# Patient Record
Sex: FEMALE | Race: OTHER | NOT HISPANIC OR LATINO
[De-identification: names, ages, dates, MRNs, and addresses within clinical notes are randomized per-mention and may not be internally consistent; named-entity substitution may affect disease eponyms.]

---

## 2023-06-28 PROBLEM — Z00.00 ENCOUNTER FOR PREVENTIVE HEALTH EXAMINATION: Status: ACTIVE | Noted: 2023-06-28

## 2023-07-05 ENCOUNTER — NON-APPOINTMENT (OUTPATIENT)
Age: 53
End: 2023-07-05

## 2023-07-10 ENCOUNTER — NON-APPOINTMENT (OUTPATIENT)
Age: 53
End: 2023-07-10

## 2023-07-10 ENCOUNTER — APPOINTMENT (OUTPATIENT)
Dept: BREAST CENTER | Facility: CLINIC | Age: 53
End: 2023-07-10

## 2023-07-31 ENCOUNTER — APPOINTMENT (OUTPATIENT)
Dept: BREAST CENTER | Facility: CLINIC | Age: 53
End: 2023-07-31
Payer: COMMERCIAL

## 2023-07-31 ENCOUNTER — APPOINTMENT (OUTPATIENT)
Age: 53
End: 2023-07-31

## 2023-07-31 VITALS
HEIGHT: 69 IN | BODY MASS INDEX: 23.55 KG/M2 | WEIGHT: 159 LBS | DIASTOLIC BLOOD PRESSURE: 66 MMHG | HEART RATE: 66 BPM | SYSTOLIC BLOOD PRESSURE: 109 MMHG

## 2023-07-31 DIAGNOSIS — Z78.9 OTHER SPECIFIED HEALTH STATUS: ICD-10-CM

## 2023-07-31 DIAGNOSIS — F10.21 ALCOHOL DEPENDENCE, IN REMISSION: ICD-10-CM

## 2023-07-31 DIAGNOSIS — N63.0 UNSPECIFIED LUMP IN UNSPECIFIED BREAST: ICD-10-CM

## 2023-07-31 DIAGNOSIS — R92.8 OTHER ABNORMAL AND INCONCLUSIVE FINDINGS ON DIAGNOSTIC IMAGING OF BREAST: ICD-10-CM

## 2023-07-31 DIAGNOSIS — Z87.891 PERSONAL HISTORY OF NICOTINE DEPENDENCE: ICD-10-CM

## 2023-07-31 PROCEDURE — 99204 OFFICE O/P NEW MOD 45 MIN: CPT

## 2023-07-31 NOTE — HISTORY OF PRESENT ILLNESS
[FreeTextEntry1] : ANNAMARIE is a 53 year old female presents for initial evaluation regarding abnormal breast imaging. Patient is s/p left breast stereotactic biopsy on 6/28/23 of indeterminate calcifications in the lateral retroareolar breast found on screening mammogram pathology yielded benign concordant findings recommended for left diagnostic mammogram in 6 months at the time patient will also be due for right breast targeted US of probably benign finding at 8:00 axis. Of note, patient reports intermittent stabbing left breast pain in retroareolar region onset years ago, denies alleviating or aggravating factors. Reports h/o dry and itchy nipples, relieved when applies moisturizer. Patient reports she has a history of estrogen based injections in mid-40's for a "couple of years", states she still receives injections as needed for hot flashes. Patient has a h/o b/l silicone implant based breast augmentation in 1993 and has not followed up with a plastic surgeon since. Patient is interested in having her implants removed.  Referral to plastic surgery was given.  The patient stated that she was interested in possible bilateral prophylactic mastectomy.  I did explain that there would be no indication unless there was a diagnosis of cancer or evidence of high risk such as a pathogenic genetic mutation.  Due to the family history of possible ovarian cancer, I recommended genetic counseling for possible genetic testing.  The patient is amenable and agrees.  LEIGHANN lifetime risk of 9.5%; Denies family history of breast cancer, reports mother had gynecological cancer, unsure of what etiology, patient believes it was possibly ovarian.

## 2023-07-31 NOTE — PAST MEDICAL HISTORY
[Menstruating] : The patient is menstruating [Menarche Age ____] : age at menarche was [unfilled] [History of Hormone Replacement Treatment] : has a history of hormone replacement treatment [Definite ___ (Date)] : the last menstrual period was [unfilled] [Total Preg ___] : G[unfilled] [FreeTextEntry6] : No [FreeTextEntry7] : Yes [FreeTextEntry8] : N/A

## 2023-07-31 NOTE — PHYSICAL EXAM
[Normocephalic] : normocephalic [EOMI] : extra ocular movement intact [Supple] : supple [Examined in the supine and seated position] : examined in the supine and seated position [No dominant masses] : no dominant masses in right breast  [No dominant masses] : no dominant masses left breast [No Nipple Retraction] : no left nipple retraction [No Nipple Discharge] : no left nipple discharge [No Axillary Lymphadenopathy] : no left axillary lymphadenopathy [No Rashes] : no rashes [de-identified] : Implant appears intact [de-identified] : Implant appears intact

## 2023-07-31 NOTE — ASSESSMENT
[FreeTextEntry1] : 53-year-old female with abnormal breast imaging and family history of ovarian cancer

## 2023-08-02 ENCOUNTER — NON-APPOINTMENT (OUTPATIENT)
Age: 53
End: 2023-08-02

## 2023-08-02 NOTE — DISCUSSION/SUMMARY
[FreeTextEntry1] : Ms. Stokes requested cancellation of her genetic test ordered on 07/31/2023. Test was cancelled on 08/02/2023 and patient received confirmation of cancellation from [email or web address] via email.   Patient may recontact [facility] and pursue genetic testing at any point in the future. We remain available for questions as well.  Tasha Wright MS, [facility] Genetic Counselor, [facility]

## 2023-08-02 NOTE — DISCUSSION/SUMMARY
[FreeTextEntry1] : Ms. Stokes requested cancellation of her genetic test ordered on 07/31/2023. Test was cancelled on 08/02/2023 and patient received confirmation of cancellation from [facility] via email.   Patient may recontact [facility] and pursue genetic testing at any point in the future. We remain available for questions as well.  Tasha Wright MS, [facility] Genetic Counselor, [facility]

## 2023-10-09 NOTE — DATA REVIEWED
[FreeTextEntry1] : 3/22/23 (R) b/l screening mammogram w/ implants: heterogeneously dense, IMPRESSION: 1. Bilateral calcifications. Recommend bilateral diagnostic mammography to include magnification views. 2. Low-density right breast masses. Recommend right breast diagnostic mammography and targeted ultrasound. 3. Recommend bilateral whole breast ultrasound to evaluate patient's dense breasts. FOLLOW-UP: Additional imaging. Bilateral diagnostic mammography. Bilateral ultrasound. ASSESSMENT: BI-RADS Category 0  5/19/23 (R) b/l dx mammogram/US: scattered areas of fibroglandular density, Small grouping of mammographically indeterminate microcalcifications lateral retroareolar left breast.. A stereotactic biopsy is recommended. The results were conveyed to the patient at the time of her recall and she will schedule the biopsy to be performed at our office. Bilateral hypoechoic masses are noted sonographically which appear benign. FOLLOW-UP: Needle biopsy. ASSESSMENT: BI-RADS Category 4: Suspicious  6/28/23 (R) left stereotactic biopsy 12:00: fibrocystic changes associated with calcium oxalate and calcium phosphate, benign, concordant, 6 month follow up left mammogram recommended, in addition patient will be due for 6 month follow up right breast US of probably benign right 8:00 US finding. 
There are no Wet Read(s) to document.

## 2023-12-13 ENCOUNTER — NON-APPOINTMENT (OUTPATIENT)
Age: 53
End: 2023-12-13

## 2023-12-18 ENCOUNTER — NON-APPOINTMENT (OUTPATIENT)
Age: 53
End: 2023-12-18

## 2023-12-18 ENCOUNTER — APPOINTMENT (OUTPATIENT)
Dept: BREAST CENTER | Facility: CLINIC | Age: 53
End: 2023-12-18

## 2023-12-28 ENCOUNTER — EMERGENCY (EMERGENCY)
Facility: HOSPITAL | Age: 53
LOS: 1 days | Discharge: LEFT BEFORE DISCHARGE | End: 2023-12-28
Attending: EMERGENCY MEDICINE | Admitting: EMERGENCY MEDICINE
Payer: COMMERCIAL

## 2023-12-28 VITALS
SYSTOLIC BLOOD PRESSURE: 98 MMHG | RESPIRATION RATE: 18 BRPM | TEMPERATURE: 98 F | HEART RATE: 68 BPM | OXYGEN SATURATION: 98 % | DIASTOLIC BLOOD PRESSURE: 65 MMHG

## 2023-12-28 LAB
AMPHET UR-MCNC: NEGATIVE — SIGNIFICANT CHANGE UP
AMPHET UR-MCNC: NEGATIVE — SIGNIFICANT CHANGE UP
ANION GAP SERPL CALC-SCNC: 8 MMOL/L — LOW (ref 9–16)
ANION GAP SERPL CALC-SCNC: 8 MMOL/L — LOW (ref 9–16)
BARBITURATES UR SCN-MCNC: NEGATIVE — SIGNIFICANT CHANGE UP
BARBITURATES UR SCN-MCNC: NEGATIVE — SIGNIFICANT CHANGE UP
BASOPHILS # BLD AUTO: 0.01 K/UL — SIGNIFICANT CHANGE UP (ref 0–0.2)
BASOPHILS # BLD AUTO: 0.01 K/UL — SIGNIFICANT CHANGE UP (ref 0–0.2)
BASOPHILS NFR BLD AUTO: 0.2 % — SIGNIFICANT CHANGE UP (ref 0–2)
BASOPHILS NFR BLD AUTO: 0.2 % — SIGNIFICANT CHANGE UP (ref 0–2)
BENZODIAZ UR-MCNC: NEGATIVE — SIGNIFICANT CHANGE UP
BENZODIAZ UR-MCNC: NEGATIVE — SIGNIFICANT CHANGE UP
BUN SERPL-MCNC: 10 MG/DL — SIGNIFICANT CHANGE UP (ref 7–23)
BUN SERPL-MCNC: 10 MG/DL — SIGNIFICANT CHANGE UP (ref 7–23)
CALCIUM SERPL-MCNC: 9.2 MG/DL — SIGNIFICANT CHANGE UP (ref 8.5–10.5)
CALCIUM SERPL-MCNC: 9.2 MG/DL — SIGNIFICANT CHANGE UP (ref 8.5–10.5)
CHLORIDE SERPL-SCNC: 101 MMOL/L — SIGNIFICANT CHANGE UP (ref 96–108)
CHLORIDE SERPL-SCNC: 101 MMOL/L — SIGNIFICANT CHANGE UP (ref 96–108)
CO2 SERPL-SCNC: 27 MMOL/L — SIGNIFICANT CHANGE UP (ref 22–31)
CO2 SERPL-SCNC: 27 MMOL/L — SIGNIFICANT CHANGE UP (ref 22–31)
COCAINE METAB.OTHER UR-MCNC: NEGATIVE — SIGNIFICANT CHANGE UP
COCAINE METAB.OTHER UR-MCNC: NEGATIVE — SIGNIFICANT CHANGE UP
CREAT SERPL-MCNC: 0.67 MG/DL — SIGNIFICANT CHANGE UP (ref 0.5–1.3)
CREAT SERPL-MCNC: 0.67 MG/DL — SIGNIFICANT CHANGE UP (ref 0.5–1.3)
EGFR: 106 ML/MIN/1.73M2 — SIGNIFICANT CHANGE UP
EGFR: 106 ML/MIN/1.73M2 — SIGNIFICANT CHANGE UP
EOSINOPHIL # BLD AUTO: 0.04 K/UL — SIGNIFICANT CHANGE UP (ref 0–0.5)
EOSINOPHIL # BLD AUTO: 0.04 K/UL — SIGNIFICANT CHANGE UP (ref 0–0.5)
EOSINOPHIL NFR BLD AUTO: 1 % — SIGNIFICANT CHANGE UP (ref 0–6)
EOSINOPHIL NFR BLD AUTO: 1 % — SIGNIFICANT CHANGE UP (ref 0–6)
GLUCOSE SERPL-MCNC: 96 MG/DL — SIGNIFICANT CHANGE UP (ref 70–99)
GLUCOSE SERPL-MCNC: 96 MG/DL — SIGNIFICANT CHANGE UP (ref 70–99)
HCT VFR BLD CALC: 32.7 % — LOW (ref 34.5–45)
HCT VFR BLD CALC: 32.7 % — LOW (ref 34.5–45)
HGB BLD-MCNC: 10.9 G/DL — LOW (ref 11.5–15.5)
HGB BLD-MCNC: 10.9 G/DL — LOW (ref 11.5–15.5)
IMM GRANULOCYTES NFR BLD AUTO: 0.2 % — SIGNIFICANT CHANGE UP (ref 0–0.9)
IMM GRANULOCYTES NFR BLD AUTO: 0.2 % — SIGNIFICANT CHANGE UP (ref 0–0.9)
LYMPHOCYTES # BLD AUTO: 1.15 K/UL — SIGNIFICANT CHANGE UP (ref 1–3.3)
LYMPHOCYTES # BLD AUTO: 1.15 K/UL — SIGNIFICANT CHANGE UP (ref 1–3.3)
LYMPHOCYTES # BLD AUTO: 27.6 % — SIGNIFICANT CHANGE UP (ref 13–44)
LYMPHOCYTES # BLD AUTO: 27.6 % — SIGNIFICANT CHANGE UP (ref 13–44)
MCHC RBC-ENTMCNC: 32.8 PG — SIGNIFICANT CHANGE UP (ref 27–34)
MCHC RBC-ENTMCNC: 32.8 PG — SIGNIFICANT CHANGE UP (ref 27–34)
MCHC RBC-ENTMCNC: 33.3 GM/DL — SIGNIFICANT CHANGE UP (ref 32–36)
MCHC RBC-ENTMCNC: 33.3 GM/DL — SIGNIFICANT CHANGE UP (ref 32–36)
MCV RBC AUTO: 98.5 FL — SIGNIFICANT CHANGE UP (ref 80–100)
MCV RBC AUTO: 98.5 FL — SIGNIFICANT CHANGE UP (ref 80–100)
METHADONE UR-MCNC: NEGATIVE — SIGNIFICANT CHANGE UP
METHADONE UR-MCNC: NEGATIVE — SIGNIFICANT CHANGE UP
MONOCYTES # BLD AUTO: 0.47 K/UL — SIGNIFICANT CHANGE UP (ref 0–0.9)
MONOCYTES # BLD AUTO: 0.47 K/UL — SIGNIFICANT CHANGE UP (ref 0–0.9)
MONOCYTES NFR BLD AUTO: 11.3 % — SIGNIFICANT CHANGE UP (ref 2–14)
MONOCYTES NFR BLD AUTO: 11.3 % — SIGNIFICANT CHANGE UP (ref 2–14)
NEUTROPHILS # BLD AUTO: 2.48 K/UL — SIGNIFICANT CHANGE UP (ref 1.8–7.4)
NEUTROPHILS # BLD AUTO: 2.48 K/UL — SIGNIFICANT CHANGE UP (ref 1.8–7.4)
NEUTROPHILS NFR BLD AUTO: 59.7 % — SIGNIFICANT CHANGE UP (ref 43–77)
NEUTROPHILS NFR BLD AUTO: 59.7 % — SIGNIFICANT CHANGE UP (ref 43–77)
NRBC # BLD: 0 /100 WBCS — SIGNIFICANT CHANGE UP (ref 0–0)
NRBC # BLD: 0 /100 WBCS — SIGNIFICANT CHANGE UP (ref 0–0)
OPIATES UR-MCNC: NEGATIVE — SIGNIFICANT CHANGE UP
OPIATES UR-MCNC: NEGATIVE — SIGNIFICANT CHANGE UP
PCP SPEC-MCNC: SIGNIFICANT CHANGE UP
PCP SPEC-MCNC: SIGNIFICANT CHANGE UP
PCP UR-MCNC: NEGATIVE — SIGNIFICANT CHANGE UP
PCP UR-MCNC: NEGATIVE — SIGNIFICANT CHANGE UP
PLATELET # BLD AUTO: 158 K/UL — SIGNIFICANT CHANGE UP (ref 150–400)
PLATELET # BLD AUTO: 158 K/UL — SIGNIFICANT CHANGE UP (ref 150–400)
POTASSIUM SERPL-MCNC: 4 MMOL/L — SIGNIFICANT CHANGE UP (ref 3.5–5.3)
POTASSIUM SERPL-MCNC: 4 MMOL/L — SIGNIFICANT CHANGE UP (ref 3.5–5.3)
POTASSIUM SERPL-SCNC: 4 MMOL/L — SIGNIFICANT CHANGE UP (ref 3.5–5.3)
POTASSIUM SERPL-SCNC: 4 MMOL/L — SIGNIFICANT CHANGE UP (ref 3.5–5.3)
RBC # BLD: 3.32 M/UL — LOW (ref 3.8–5.2)
RBC # BLD: 3.32 M/UL — LOW (ref 3.8–5.2)
RBC # FLD: 12.6 % — SIGNIFICANT CHANGE UP (ref 10.3–14.5)
RBC # FLD: 12.6 % — SIGNIFICANT CHANGE UP (ref 10.3–14.5)
SODIUM SERPL-SCNC: 136 MMOL/L — SIGNIFICANT CHANGE UP (ref 132–145)
SODIUM SERPL-SCNC: 136 MMOL/L — SIGNIFICANT CHANGE UP (ref 132–145)
THC UR QL: NEGATIVE — SIGNIFICANT CHANGE UP
THC UR QL: NEGATIVE — SIGNIFICANT CHANGE UP
WBC # BLD: 4.16 K/UL — SIGNIFICANT CHANGE UP (ref 3.8–10.5)
WBC # BLD: 4.16 K/UL — SIGNIFICANT CHANGE UP (ref 3.8–10.5)
WBC # FLD AUTO: 4.16 K/UL — SIGNIFICANT CHANGE UP (ref 3.8–10.5)
WBC # FLD AUTO: 4.16 K/UL — SIGNIFICANT CHANGE UP (ref 3.8–10.5)

## 2023-12-28 PROCEDURE — 99284 EMERGENCY DEPT VISIT MOD MDM: CPT

## 2023-12-28 RX ORDER — SODIUM CHLORIDE 9 MG/ML
1000 INJECTION INTRAMUSCULAR; INTRAVENOUS; SUBCUTANEOUS
Refills: 0 | Status: DISCONTINUED | OUTPATIENT
Start: 2023-12-28 | End: 2023-12-31

## 2023-12-28 RX ADMIN — SODIUM CHLORIDE 1000 MILLILITER(S): 9 INJECTION INTRAMUSCULAR; INTRAVENOUS; SUBCUTANEOUS at 13:09

## 2023-12-28 NOTE — ED PROVIDER NOTE - OBJECTIVE STATEMENT
53y F, here c/o 4 week history of numbness, tingling of entire body, metallic taste in mouth and hair loss;  Patient reports she was seen yesterday at [facility] for same and "flushed out" but insists no blood work was done.  Patient reports she is being poisoned by thallium - when asked why, she reports that the water in her bldg is bad and that they are doing construction near by.  Patient is here requesting "Khmer" blue which she reports is the antidote for thallium poisoning  No chest pain , no sob;  No n/v/d.  No abdominal pain 53y F, here c/o 4 week history of numbness, tingling of entire body, metallic taste in mouth and hair loss;  Patient reports she was seen yesterday at [facility] for same and "flushed out" but insists no blood work was done.  Patient reports she is being poisoned by thallium - when asked why, she reports that the water in her bldg is bad and that they are doing construction near by.  Patient is here requesting "Bulgarian" blue which she reports is the antidote for thallium poisoning  No chest pain , no sob;  No n/v/d.  No abdominal pain

## 2023-12-28 NOTE — ED PROVIDER NOTE - CLINICAL SUMMARY MEDICAL DECISION MAKING FREE TEXT BOX
54 yo F here with numbness and concerns for thallium poisoning  Non focal on exam  Will check labs, electrolytes;   Advised thallium poisoning screening not performed here  I d/w patient need to have yearly physical with PMD  Patient has delusions but not acutely psychotic 54 yo F here with numbness and concerns for thallium poisoning  Non focal on exam  Will check labs, electrolytes;   Patient requesting drug screen; I advised that our drug screen does not scan for heavy metals  Advised thallium poisoning screening not performed here  I d/w patient need to have yearly physical with PMD  Patient appears to be paranoid but is not acutely psychotic, has reasoning capabilities, not SI or HI and does not require acute psychiatric evaluation    Patient well appearing, normal lab work, mild anemia; no concerning lab abnormalities;  Advised outpatient follow up with PMD prior to patient elopement

## 2023-12-28 NOTE — ED ADULT NURSE NOTE - NS ED NOTE ABUSE RESPONSE YN
November 29, 2021     Patient: Penny Harrison   YOB: 2005   Date of Visit: 11/29/2021       To Whom it May Concern:    Penny Harrison was seen in my clinic on 11/29/2021 at 11:15 am.     Please excuse Penny for her absence from school on the date listed above to be able to make her appointment.    Penny may participate in all classes, activities, PE and sports without restriction as tolerated.    Sincerely,         Ottoniel Banks MD    Medical information is confidential and cannot be disclosed without the written consent of the patient or her representative.       Yes

## 2023-12-28 NOTE — ED ADULT TRIAGE NOTE - CHIEF COMPLAINT QUOTE
Pt states " I think the water in my building is poisoned." PT complaining of headache, dizziness, full body tingling and soapy taste x weeks. Pt states that she went to [facility] yesterday. Pt states " I think the water in my building is poisoned." PT complaining of headache, dizziness, full body tingling and soapy taste x weeks. Pt states that she went to [facility] yesterday.

## 2023-12-28 NOTE — ED ADULT NURSE NOTE - CHIEF COMPLAINT QUOTE
Pt states " I think the water in my building is poisoned." PT complaining of headache, dizziness, full body tingling and soapy taste x weeks. Pt states that she went to [facility] yesterday. Pt states " I think the water in my building is poisoned." PT complaining of headache, dizziness, full body tingling and soapy taste x weeks. Pt states that she went to [facility] yesterday.

## 2023-12-28 NOTE — ED PROVIDER NOTE - PHYSICAL EXAMINATION
CONSTITUTIONAL: Well-appearing; in no apparent distress.   HEAD: Normocephalic; atraumatic.   EYES: PERRL; EOM intact; conjunctiva and sclera clear;  ENT: normal nose; no rhinorrhea;   airway patent  NECK: Supple; non-tender; no stiffness  CARDIOVASCULAR: Regular rate and rhythm.   RESPIRATORY: Lungs clear, non labored  GI: Soft; non-distended; non-tender;   EXT: No cyanosis or edema; N/V intact  SKIN: Normal for age and race; warm; dry; good turgor; no apparent lesions or rash. no cyanosis  NEURO: Alert and oriented; face symmetric; grossly unremarkable.   Sensation grossly intact; moving all extremities; 5/5 strength; steady gait

## 2023-12-28 NOTE — ED ADULT NURSE NOTE - NSFALLUNIVINTERV_ED_ALL_ED
Bed/Stretcher in lowest position, wheels locked, appropriate side rails in place/Call bell, personal items and telephone in reach/Instruct patient to call for assistance before getting out of bed/chair/stretcher/Non-slip footwear applied when patient is off stretcher/[place] to call system/Physically safe environment - no spills, clutter or unnecessary equipment/Purposeful proactive rounding/Room/bathroom lighting operational, light cord in reach Bed/Stretcher in lowest position, wheels locked, appropriate side rails in place/Call bell, personal items and telephone in reach/Instruct patient to call for assistance before getting out of bed/chair/stretcher/Non-slip footwear applied when patient is off stretcher/[place] to call system/Physically safe environment - no spills, clutter or unnecessary equipment/Purposeful proactive rounding/Room/bathroom lighting operational, light cord in reach

## 2023-12-28 NOTE — ED ADULT NURSE NOTE - OBJECTIVE STATEMENT
Pt states " I think the water in my building is poisoned." PT complaining of headache, dizziness, full body tingling and soapy taste x weeks. Pt states that she went to [facility] yesterday. Pt states " I think the water in my building is poisoned." PT complaining of headache, dizziness, full body tingling and soapy taste x weeks. Pt states that she went to [facility] yesterday.

## 2023-12-28 NOTE — ED PROVIDER NOTE - NS ED ROS FT
Other than symptoms associated with present events the following is reported:  General:  No fever, no chills, no weight loss. (+) change in taste  HEENT:  No sore throat.   Respiratory: No cough, no dyspnea, no wheeze.  Cardiovascular:  No chest pain, no palpitations, no orthopnea.  GI: No abdominal pain, no nausea/vomiting, no diarrhea.  : No dysuria, no frequency, no urgency.  Musculoskeletal:  No joint pain, no myalgia.  Endocrine:  No generalized weakness, no polyuria.  Neurological:  (+) numbness  Psychiatric: No emotional stress, no depression.  Heme:  No bruising, no bleeding.

## 2023-12-30 DIAGNOSIS — R20.2 PARESTHESIA OF SKIN: ICD-10-CM

## 2023-12-30 DIAGNOSIS — R43.8 OTHER DISTURBANCES OF SMELL AND TASTE: ICD-10-CM

## 2023-12-30 DIAGNOSIS — Z53.29 PROCEDURE AND TREATMENT NOT CARRIED OUT BECAUSE OF PATIENT'S DECISION FOR OTHER REASONS: ICD-10-CM

## 2023-12-30 DIAGNOSIS — R20.0 ANESTHESIA OF SKIN: ICD-10-CM

## 2025-08-25 PROBLEM — F64.9 GENDER DYSPHORIA: Status: ACTIVE | Noted: 2025-08-25

## 2025-08-29 ENCOUNTER — APPOINTMENT (OUTPATIENT)
Dept: PLASTIC SURGERY | Facility: CLINIC | Age: 55
End: 2025-08-29

## 2025-08-29 DIAGNOSIS — F64.9 GENDER IDENTITY DISORDER, UNSPECIFIED: ICD-10-CM
